# Patient Record
Sex: FEMALE | Race: WHITE | Employment: UNEMPLOYED | URBAN - METROPOLITAN AREA
[De-identification: names, ages, dates, MRNs, and addresses within clinical notes are randomized per-mention and may not be internally consistent; named-entity substitution may affect disease eponyms.]

---

## 2022-12-26 ENCOUNTER — HOSPITAL ENCOUNTER (EMERGENCY)
Age: 32
Discharge: HOME | End: 2022-12-26
Payer: MEDICAID

## 2022-12-26 VITALS
RESPIRATION RATE: 16 BRPM | DIASTOLIC BLOOD PRESSURE: 73 MMHG | BODY MASS INDEX: 24.94 KG/M2 | TEMPERATURE: 98.2 F | OXYGEN SATURATION: 98 % | HEIGHT: 60 IN | SYSTOLIC BLOOD PRESSURE: 112 MMHG | HEART RATE: 76 BPM | WEIGHT: 127 LBS

## 2022-12-26 DIAGNOSIS — J03.90 EXUDATIVE TONSILLITIS: Primary | ICD-10-CM

## 2022-12-26 LAB — S PYO AG THROAT QL: NEGATIVE

## 2022-12-26 PROCEDURE — 87081 CULTURE SCREEN ONLY: CPT

## 2022-12-26 PROCEDURE — 87880 STREP A ASSAY W/OPTIC: CPT

## 2022-12-26 PROCEDURE — 99283 EMERGENCY DEPT VISIT LOW MDM: CPT

## 2022-12-26 RX ORDER — CEPHALEXIN 500 MG/1
500 CAPSULE ORAL 3 TIMES DAILY
Qty: 21 CAPSULE | Refills: 0 | Status: SHIPPED | OUTPATIENT
Start: 2022-12-26 | End: 2023-01-02

## 2022-12-26 ASSESSMENT — PAIN SCALES - GENERAL: PAINLEVEL_OUTOF10: 8

## 2022-12-26 ASSESSMENT — PAIN DESCRIPTION - PAIN TYPE: TYPE: ACUTE PAIN

## 2022-12-26 ASSESSMENT — PAIN DESCRIPTION - DESCRIPTORS: DESCRIPTORS: BURNING

## 2022-12-26 ASSESSMENT — PAIN - FUNCTIONAL ASSESSMENT: PAIN_FUNCTIONAL_ASSESSMENT: 0-10

## 2022-12-26 ASSESSMENT — PAIN DESCRIPTION - LOCATION: LOCATION: THROAT

## 2022-12-26 NOTE — ED PROVIDER NOTES
[id number] [facility]  EMERGENCY DEPARTMENT ENCOUNTER        Pt Name: Moncho Holt  MRN: [id number]  [place] 1990  Date of evaluation: 2022  Provider: John Bailey PA-C  PCP: None None  Note Started: 2:07 PM EST 22        ALBAN. I have evaluated this patient. My supervising physician was available for consultation. CHIEF COMPLAINT       Chief Complaint   Patient presents with    Pharyngitis     Pt c/o sore throat x 4 days, states has enlarged tonsils and looks like pus on them, neck pain, fever       HISTORY OF PRESENT ILLNESS   (Location, Timing/Onset, Context/Setting, Quality, Duration, Modifying Factors, Severity, Associated Signs and Symptoms)  Note limiting factors. Chief Complaint: Sore throat    Moncho Holt is a 27 y.o. female who presents to the emergency department for evaluation of sore throat. Onset 4 days ago. Worse yesterday. A bit better at this time as she had ibuprofen 400 mg about 3 hours ago. She noted some white areas on both tonsils. States as a youth she was prone to strep. She indicates no fevers, chills, chest pain or shortness of breath. No rash. She indicates no cough or nasal congestion. She reports glands are tender bilaterally. She does report temp yesterday 101 controlled with Tylenol/ibuprofen. Nursing Notes were all reviewed and agreed with or any disagreements were addressed in the HPI. REVIEW OF SYSTEMS    (2-9 systems for level 4, 10 or more for level 5)     Review of Systems    Positives and Pertinent negatives as per HPI. Except as noted above in the ROS, all other systems were reviewed and negative. PAST MEDICAL HISTORY   History reviewed. No pertinent past medical history.       SURGICAL HISTORY     Past Surgical History:   Procedure Laterality Date     SECTION      x5         CURRENTMEDICATIONS       Previous Medications    No medications on file         ALLERGIES     Patient has no known allergies. FAMILYHISTORY     History reviewed. No pertinent family history. SOCIAL HISTORY       Social History     Tobacco Use    Smoking status: Never    Smokeless tobacco: Never   Substance Use Topics    Alcohol use: Never    Drug use: Never       SCREENINGS                         CIWA Assessment  BP: 119/76  Heart Rate: 75             PHYSICAL EXAM    (up to 7 for level 4, 8 or more for level 5)     ED Triage Vitals [12/26/22 1401]   BP Temp Temp Source Heart Rate Resp SpO2 Height Weight   119/76 98.2 °F (36.8 °C) Oral 75 16 98 % 5' (1.524 m) 127 lb (57.6 kg)       Physical Exam  Vitals and nursing note reviewed. Constitutional:       Appearance: Normal appearance. She is well-developed and normal weight. HENT:      Head: Normocephalic and atraumatic. Right Ear: Tympanic membrane, ear canal and external ear normal.      Left Ear: Tympanic membrane, ear canal and external ear normal.      Nose: Nose normal.      Mouth/Throat:      Mouth: Mucous membranes are moist.      Pharynx: Oropharyngeal exudate and posterior oropharyngeal erythema present. Comments: I do not see any evidence of deviation of the anatomy to suggest peritonsillar abscess. No diffuse erythema to suggest cellulitic process  Eyes:      General:         Right eye: No discharge. Left eye: No discharge. Conjunctiva/sclera: Conjunctivae normal.   Cardiovascular:      Rate and Rhythm: Normal rate and regular rhythm. Pulmonary:      Effort: Pulmonary effort is normal.      Breath sounds: Normal breath sounds. Abdominal:      General: Abdomen is flat. Bowel sounds are normal.      Palpations: Abdomen is soft. Tenderness: There is no abdominal tenderness. Musculoskeletal:         General: Normal range of motion. Cervical back: Normal range of motion and neck supple. Right lower leg: No edema. Left lower leg: No edema. Lymphadenopathy:      Cervical: Cervical adenopathy present.    Skin: General: Skin is warm and dry. Neurological:      General: No focal deficit present. Mental Status: She is alert and oriented to person, place, and time. Mental status is at baseline. Psychiatric:         Mood and Affect: Mood normal.         Behavior: Behavior normal.         Thought Content: Thought content normal.         Judgment: Judgment normal.       DIAGNOSTIC RESULTS   LABS:    Labs Reviewed   STREP SCREEN GROUP A THROAT   CULTURE, BETA STREP CONFIRM PLATES       When ordered only abnormal lab results are displayed. All other labs were within normal range or not returned as of this dictation. EKG: When ordered, EKG's are interpreted by the Emergency Department Physician in the absence of a cardiologist.  Please see their note for interpretation of EKG. RADIOLOGY:   Non-plain film images such as CT, Ultrasound and MRI are read by the radiologist. Plain radiographic images are visualized and preliminarily interpreted by the ED Provider with the below findings:        Interpretation per the Radiologist below, if available at the time of this note:    No orders to display     No results found. No results found. PROCEDURES   Unless otherwise noted below, none     Procedures    CRITICAL CARE TIME       CONSULTS:  None      EMERGENCY DEPARTMENT COURSE and DIFFERENTIAL DIAGNOSIS/MDM:   Vitals:    Vitals:    12/26/22 1401   BP: 119/76   Pulse: 75   Resp: 16   Temp: 98.2 °F (36.8 °C)   TempSrc: Oral   SpO2: 98%   Weight: 127 lb (57.6 kg)   Height: 5' (1.524 m)       Patient was given the following medications:  Medications - No data to display      Is this patient to be included in the SEP-1 Core Measure due to severe sepsis or septic shock? No   Exclusion criteria - the patient is NOT to be included for SEP-1 Core Measure due to: Infection is not suspected    The patient rapid strep negative. Culture pending. I will treat with Keflex 500 mg 3 times daily #21.   Salt water gargles recommended. She was ibuprofen six 1 mg p.o. 3 times daily along with Tylenol 1000 mg p.o. 3 times daily for pain control. Patient reassured. The patient did express understanding of her diagnosis and the treatment plan. FINAL IMPRESSION      1. Exudative tonsillitis          DISPOSITION/PLAN   DISPOSITION Decision To Discharge 12/26/2022 03:39:52 PM      PATIENT REFERRED TO:  No follow-up provider specified. DISCHARGE MEDICATIONS:  New Prescriptions    CEPHALEXIN (KEFLEX) 500 MG CAPSULE    Take 1 capsule by mouth 3 times daily for 7 days       DISCONTINUED MEDICATIONS:  Discontinued Medications    No medications on file              (Please note that portions of this note were completed with a voice recognition program.  Efforts were made to edit the dictations but occasionally words are mis-transcribed. )    Carlos Cast PA-C (electronically signed)            Carlos aCst PA-C  12/26/22 [id number]

## 2022-12-26 NOTE — ED TRIAGE NOTES
Chief Complaint   Patient presents with    Pharyngitis     Pt c/o sore throat x 4 days, states has enlarged tonsils and looks like pus on them, neck pain, fever

## 2022-12-26 NOTE — DISCHARGE INSTRUCTIONS
The rapid strep negative. Culture pending. You do have exudative tonsillitis. I will treat with Keflex 500 mg take this 3 times daily. For pain control and inflammation control I do recommend ibuprofen 600 mg 3 times daily and Tylenol 1000 mg 3 times daily. Salt water gargles may benefit.

## 2022-12-28 LAB — S PYO THROAT QL CULT: NORMAL
